# Patient Record
Sex: FEMALE | Race: WHITE | Employment: OTHER | URBAN - METROPOLITAN AREA
[De-identification: names, ages, dates, MRNs, and addresses within clinical notes are randomized per-mention and may not be internally consistent; named-entity substitution may affect disease eponyms.]

---

## 2017-02-16 ENCOUNTER — HOSPITAL ENCOUNTER (OUTPATIENT)
Dept: ULTRASOUND IMAGING | Age: 62
Discharge: HOME | End: 2017-02-16
Attending: ORTHOPAEDIC SURGERY
Payer: COMMERCIAL

## 2017-02-16 DIAGNOSIS — R60.9 SWELLING: ICD-10-CM

## 2017-02-16 DIAGNOSIS — M25.562 LEFT KNEE PAIN: ICD-10-CM

## 2017-02-16 PROCEDURE — 93971 EXTREMITY STUDY: CPT

## 2021-10-15 ENCOUNTER — TRANSCRIBE ORDER (OUTPATIENT)
Dept: SCHEDULING | Age: 66
End: 2021-10-15

## 2021-10-15 DIAGNOSIS — I67.1 CEREBRAL ANEURYSM: Primary | ICD-10-CM

## 2021-11-04 ENCOUNTER — HOSPITAL ENCOUNTER (OUTPATIENT)
Dept: CT IMAGING | Age: 66
Discharge: HOME | End: 2021-11-04
Attending: INTERNAL MEDICINE
Payer: COMMERCIAL

## 2021-11-04 DIAGNOSIS — I67.1 CEREBRAL ANEURYSM: ICD-10-CM

## 2021-11-04 LAB — CREAT BLD-MCNC: 0.8 MG/DL (ref 0.8–1.5)

## 2021-11-04 PROCEDURE — 70498 CT ANGIOGRAPHY NECK: CPT

## 2021-11-04 PROCEDURE — 74011000636 HC RX REV CODE- 636: Performed by: INTERNAL MEDICINE

## 2021-11-04 PROCEDURE — 74011000258 HC RX REV CODE- 258: Performed by: INTERNAL MEDICINE

## 2021-11-04 PROCEDURE — 82565 ASSAY OF CREATININE: CPT

## 2021-11-04 RX ORDER — SODIUM CHLORIDE 0.9 % (FLUSH) 0.9 %
10 SYRINGE (ML) INJECTION
Status: COMPLETED | OUTPATIENT
Start: 2021-11-04 | End: 2021-11-04

## 2021-11-04 RX ADMIN — Medication 10 ML: at 09:29

## 2021-11-04 RX ADMIN — IOPAMIDOL 50 ML: 755 INJECTION, SOLUTION INTRAVENOUS at 09:29

## 2021-11-04 RX ADMIN — SODIUM CHLORIDE 100 ML: 900 INJECTION, SOLUTION INTRAVENOUS at 09:29

## 2023-09-12 ENCOUNTER — OFFICE VISIT (OUTPATIENT)
Dept: ORTHOPEDIC SURGERY | Age: 68
End: 2023-09-12

## 2023-09-12 VITALS — HEIGHT: 63 IN | WEIGHT: 151 LBS | BODY MASS INDEX: 26.75 KG/M2

## 2023-09-12 DIAGNOSIS — M25.561 CHRONIC PAIN OF RIGHT KNEE: Primary | ICD-10-CM

## 2023-09-12 DIAGNOSIS — M17.12 PRIMARY OSTEOARTHRITIS OF LEFT KNEE: ICD-10-CM

## 2023-09-12 DIAGNOSIS — G89.29 CHRONIC PAIN OF RIGHT KNEE: Primary | ICD-10-CM

## 2023-09-12 DIAGNOSIS — M17.11 PRIMARY OSTEOARTHRITIS OF RIGHT KNEE: ICD-10-CM

## 2023-09-12 RX ORDER — METHYLPREDNISOLONE ACETATE 40 MG/ML
40 INJECTION, SUSPENSION INTRA-ARTICULAR; INTRALESIONAL; INTRAMUSCULAR; SOFT TISSUE ONCE
Status: COMPLETED | OUTPATIENT
Start: 2023-09-12 | End: 2023-09-12

## 2023-09-12 RX ADMIN — METHYLPREDNISOLONE ACETATE 80 MG: 40 INJECTION, SUSPENSION INTRA-ARTICULAR; INTRALESIONAL; INTRAMUSCULAR; SOFT TISSUE at 11:48

## 2023-09-12 NOTE — PROGRESS NOTES
Name: Sebastien Obrien  YOB: 1955  Gender: female  MRN: [id number]     CC: Right knee pain    HPI:   Years of bilateral knee concerns. Reports trying multiple injections including viscoelastic. May 2023: Bilateral knee pain increased after the passing of both her mother and father required her having to go up and down stairs to clean out their house. 09/03/2023: She reports getting out of her car with increased Right knee pain: No trauma  09/12/2023: Initial visit: Right knee pain    ROS/Meds/PSH/PMH/FH/SH: reviewed today    Tobacco:  reports that she has never smoked. She has never used smokeless tobacco.     Physical Examination:  Patient appears to be alert and oriented with acceptable appearance.   No obvious distress or SOB  CV: appears to have acceptable vascular color and capillary refill  Neuro: appears to have mostly intact light touch sensation   Skin: Bilateral anterior medial knee thickening; right soft knee effusion  MS: Standing: Genu varum: Gait slightly protected right  Right = anterior medial knee pain; no lateral pain; no patella pain  Left = mild anterior medial knee pain; no lateral pain; no patella pain  Bilateral = acceptable knee motion; 5/5 strength; no gross instability or crepitance    XR: Right knee: Standing AP lateral notch and sunrise views taken today with tricompartmental knee arthritis with medial column collapse; no acute pathology appreciated; multiple exostoses  XR Impression:  As above and compared to prior knee XR    Reviewed Test/Records/Documents:   06/08/2016: Dr. Martin Thomas: Left knee abrasion arthroplasty, partial medial meniscectomy  02/11/2021: Dr. Martin Thomas: Bilateral knee osteoarthritis injections Depo-Medrol     Injection: We discussed risks/complication of injection decided proceed: After sterile prep: Right anterior medial knee joint injected 2 cc Xylocaine, 80 mg Depo-Medrol; she did well    Assessment:    Right genu varum knee

## 2023-09-12 NOTE — PROGRESS NOTES
Patient was fitted and instructed on bilateral Genutrain Titan 4 Knee Braces for the both knees. Patient read and signed documenting they understand and agree to [facility]'s current DME return policy.

## 2023-10-05 ENCOUNTER — OFFICE VISIT (OUTPATIENT)
Dept: ORTHOPEDIC SURGERY | Age: 68
End: 2023-10-05

## 2023-10-05 DIAGNOSIS — M17.11 PRIMARY OSTEOARTHRITIS OF RIGHT KNEE: Primary | ICD-10-CM

## 2023-10-05 NOTE — PROGRESS NOTES
neurologic deficit. Grossly moves bilateral upper extremities without motor or sensory deficits. No gross weakness noted in the lower extremities. No hyporeflexia or hyperreflexia noted. Vascular: No gross arterial or venous deficiency noted. DP and PT pulses are palpable in the lower extremities  Lymphatic: No lymphedema noted in the lower extremities. Skin: No prior incisions noted about the right knee. No obvious rashes noted about the area. No skin changes noted about the knee or about the adjacent thigh or leg. Extremities:  Patient ambulates with an antalgic gait. There is pain with ROM of the right knee. Range of motion is 0-130. Trace effusion noted in the knee. Patellofemoral crepitus is present. Distally the patient shows no neurologic deficit. Xrays (obtained either today or previously):    Heading: XR Knee 3/4 View  Views: AP knee, skiers PA view, lateral knee, sunrise view right knee  Clinical indication: Right Knee Pain   Findings: Xrays of the knees obtained today or previously show tricompartmental bone-on-bone arthritic changes of the right knee with associated osteophyte formation and subchondral sclerosis. Impression: Right Knee osteoarthritis    Conor Mike MD    Assessment:   1. Arthritis of the Right knee    Plan:  Differential diagnosis and treatment options have been discussed with the patient. Risks, benefits and alternatives of each were discussed and patient questions answered. We discussed oral anti-inflammatory medications, activity modifications, physical therapy, corticosteroid injections, weight loss (if appropriate), and surgery. We discussed that given the degenerative nature of the joint that in most cases surgery is the definitive treatment for this condition. We did discuss some of the details of surgery along with some of the risks, benefits and alternatives.  At this point the patient is a candidate for surgery however they would like to try to postpone surgery at

## 2023-10-19 ENCOUNTER — OFFICE VISIT (OUTPATIENT)
Dept: ORTHOPEDIC SURGERY | Age: 68
End: 2023-10-19

## 2023-10-19 DIAGNOSIS — M17.11 PRIMARY OSTEOARTHRITIS OF RIGHT KNEE: Primary | ICD-10-CM

## 2023-10-19 RX ORDER — HYALURONATE SODIUM 10 MG/ML
20 SYRINGE (ML) INTRAARTICULAR ONCE
Status: COMPLETED | OUTPATIENT
Start: 2023-10-19 | End: 2023-10-19

## 2023-10-19 RX ADMIN — Medication 20 MG: at 09:55

## 2023-10-19 NOTE — PROGRESS NOTES
and alternatives regarding injection were discussed with patient and patient wished to proceed. Under sterile technique the right knee was injected with Euflexxa (2ml) . The patient tolerated procedure well. Patient is instructed to ice the joint for next few days if they experience discomfort.  The patient will followup as directed        Signed By: Barbie Lombardi MD  October 19, 2023

## 2023-10-26 ENCOUNTER — OFFICE VISIT (OUTPATIENT)
Dept: ORTHOPEDIC SURGERY | Age: 68
End: 2023-10-26

## 2023-10-26 DIAGNOSIS — M17.11 PRIMARY OSTEOARTHRITIS OF RIGHT KNEE: Primary | ICD-10-CM

## 2023-10-26 RX ORDER — HYALURONATE SODIUM 10 MG/ML
20 SYRINGE (ML) INTRAARTICULAR ONCE
Status: COMPLETED | OUTPATIENT
Start: 2023-10-26 | End: 2023-10-26

## 2023-10-26 RX ADMIN — Medication 20 MG: at 10:09

## 2023-10-26 NOTE — PROGRESS NOTES
Diagnosis: Right Knee Arthritis    Patient present today for the 2nd viscosupplimentation injection in the right knee. Prior injection was tolerated well. Risks, benefits, and alteratives were discussed with patient prior to injection. Under sterile technique, the right knee was injected with Euflexxa (2ml). The patient tolerated the procedure well. Patient is advised to ice the knee over the next week or so. Continue taking prior oral medications. The patient can follow-up as directed for the final injection in the series.       Gianni Peter, APRN - CNP

## 2023-11-02 ENCOUNTER — OFFICE VISIT (OUTPATIENT)
Dept: ORTHOPEDIC SURGERY | Age: 68
End: 2023-11-02

## 2023-11-02 DIAGNOSIS — M17.11 PRIMARY OSTEOARTHRITIS OF RIGHT KNEE: Primary | ICD-10-CM

## 2023-11-02 RX ORDER — HYALURONATE SODIUM 10 MG/ML
20 SYRINGE (ML) INTRAARTICULAR ONCE
Status: COMPLETED | OUTPATIENT
Start: 2023-11-02 | End: 2023-11-02

## 2023-11-02 RX ADMIN — Medication 20 MG: at 10:01

## 2023-11-02 NOTE — PROGRESS NOTES
Diagnosis: Right Knee arthritis    Patient present today for the 3rd viscosupplimentation injection in the right knee. Prior injection was tolerated well. Risks, benefits, and alteratives were discussed with patient prior to injection. Under sterile technique, the right knee was injected with Euflexxa (2ml). The patient tolerated the procedure well. Patient is advised to ice the knee over the next week or so. Continue taking prior oral medications. The patient can follow-up as directed for the final injection in the series.

## 2024-01-11 ENCOUNTER — OFFICE VISIT (OUTPATIENT)
Dept: ORTHOPEDIC SURGERY | Age: 69
End: 2024-01-11

## 2024-01-11 DIAGNOSIS — M17.11 PRIMARY OSTEOARTHRITIS OF RIGHT KNEE: Primary | ICD-10-CM

## 2024-01-11 PROCEDURE — 3017F COLORECTAL CA SCREEN DOC REV: CPT | Performed by: ORTHOPAEDIC SURGERY

## 2024-01-11 PROCEDURE — 1036F TOBACCO NON-USER: CPT | Performed by: ORTHOPAEDIC SURGERY

## 2024-01-11 RX ORDER — METHYLPREDNISOLONE ACETATE 40 MG/ML
40 INJECTION, SUSPENSION INTRA-ARTICULAR; INTRALESIONAL; INTRAMUSCULAR; SOFT TISSUE ONCE
Status: COMPLETED | OUTPATIENT
Start: 2024-01-11 | End: 2024-01-11

## 2024-01-11 RX ADMIN — METHYLPREDNISOLONE ACETATE 40 MG: 40 INJECTION, SUSPENSION INTRA-ARTICULAR; INTRALESIONAL; INTRAMUSCULAR; SOFT TISSUE at 14:02

## 2024-01-11 NOTE — PROGRESS NOTES
Patient ID:  Sarah Beth Chowdhury  [id number]  68 y.o.  1955    Today: 2024          Chief Complaint:  Right Knee pain    HPI:       Sarah Beth Chowdhury is a 68 y.o. female seen for evaluation and treatment of followup of right knee osteoarthritis. The patient reports pain along the joint lines, reports stiffness of the knee with prolonged inactivity, and swelling/pain at the end of the day and after increased physical activity. Generally, symptoms improve with sitting/rest. The pain affects the patient’s activities of daily living and quality of life. Patient reports progressive pain and instability in the knee. The pain has been ongoing for an extended period of time. Pain ranges from approximately 4-8 in a cyclical fashion with periods of acute exacerbation.     Patient has attempted prior conservative treatment including medications and activity modification.    Treatment to date has included OTC medications and activity modification. At one point the patient has had a steroid injection in the right knee which provided 3+ months of pain improvement.    Past Medical History:  Past Medical History:   Diagnosis Date    Arthritis     generalized    Chronic pain     Depression     daily meds    GERD (gastroesophageal reflux disease)     daily meds    Thyroid disease        Past Surgical History:  Past Surgical History:   Procedure Laterality Date    BREAST SURGERY      as a baby mammary sx    GYN       x1    TONSILLECTOMY          Medications:     Prior to Admission medications    Medication Sig Start Date End Date Taking? Authorizing Provider   amLODIPine (NORVASC) 5 MG tablet Take 5 mg by mouth daily 20   Automatic Reconciliation, Ar   ascorbic acid (VITAMIN C) 500 MG tablet Take by mouth daily    Automatic Reconciliation, Ar   aspirin 81 MG EC tablet Take 81 mg by mouth daily    Automatic Reconciliation, Ar   buPROPion (WELLBUTRIN XL) 300 MG extended release tablet Take 300 mg by

## 2024-04-11 ENCOUNTER — OFFICE VISIT (OUTPATIENT)
Dept: ORTHOPEDIC SURGERY | Age: 69
End: 2024-04-11

## 2024-04-11 DIAGNOSIS — M17.11 PRIMARY OSTEOARTHRITIS OF RIGHT KNEE: Primary | ICD-10-CM

## 2024-04-11 RX ORDER — METHYLPREDNISOLONE ACETATE 40 MG/ML
40 INJECTION, SUSPENSION INTRA-ARTICULAR; INTRALESIONAL; INTRAMUSCULAR; SOFT TISSUE ONCE
Status: COMPLETED | OUTPATIENT
Start: 2024-04-11 | End: 2024-04-11

## 2024-04-11 RX ADMIN — METHYLPREDNISOLONE ACETATE 40 MG: 40 INJECTION, SUSPENSION INTRA-ARTICULAR; INTRALESIONAL; INTRAMUSCULAR; SOFT TISSUE at 13:42

## 2024-04-11 NOTE — PROGRESS NOTES
noted.  Vascular: No gross arterial or venous deficiency noted. DP and PT pulses are palpable in the lower extremities  Lymphatic: No lymphedema noted in the lower extremities.  Skin: No prior incisions noted about the right knee. No obvious rashes noted about the area. No skin changes noted about the knee or about the adjacent thigh or leg.  Extremities:  Patient ambulates with an antalgic gait. There is pain with ROM of the left knee. Range of motion is 0-120. Trace effusion noted in the knee. Patellofemoral crepitus is present. Distally the patient shows no neurologic deficit.        Xrays (obtained either today or previously):    Heading: XR Knee 3/4 View  Views: AP knee, skiers PA knee, lateral knee, sunrise view right knee  Clinical indication: Right Knee Pain   Findings: Xrays of the knees obtained today or previously show tricompartmental bone-on-bone arthritic changes with associated osteophyte formation and subchondral sclerosis.  Impression: Right Knee osteoarthritis    Tuan Reardon MD    Assessment:   1. Arthritis of the Right knee    Plan:  Differential diagnosis and treatment options have been discussed with the patient. Risks, benefits and alternatives of each were discussed and patient questions answered. We discussed oral anti-inflammatory medications, activity modifications, physical therapy, corticosteroid injections, weight loss (if appropriate), and surgery. We discussed that given the degenerative nature of the joint that in most cases surgery is the definitive treatment for this condition. We did discuss some of the details of surgery along with some of the risks, benefits and alternatives. At this point the patient is a candidate for surgery however they would like to try to postpone surgery at this point in time if possible.  At this point the patient has failed the aforementioned treatment modalities and would like to proceed with Corticosteroid Injection. We discussed potential risks of

## 2024-07-11 ENCOUNTER — OFFICE VISIT (OUTPATIENT)
Dept: ORTHOPEDIC SURGERY | Age: 69
End: 2024-07-11

## 2024-07-11 DIAGNOSIS — M17.11 PRIMARY OSTEOARTHRITIS OF RIGHT KNEE: Primary | ICD-10-CM

## 2024-07-11 RX ORDER — METHYLPREDNISOLONE ACETATE 40 MG/ML
40 INJECTION, SUSPENSION INTRA-ARTICULAR; INTRALESIONAL; INTRAMUSCULAR; SOFT TISSUE ONCE
Status: COMPLETED | OUTPATIENT
Start: 2024-07-11 | End: 2024-07-11

## 2024-07-11 RX ADMIN — METHYLPREDNISOLONE ACETATE 40 MG: 40 INJECTION, SUSPENSION INTRA-ARTICULAR; INTRALESIONAL; INTRAMUSCULAR; SOFT TISSUE at 10:51

## 2024-07-11 NOTE — PROGRESS NOTES
Patient ID:  Sarah Beth Chowdhury  [id number]  68 y.o.  1955    Today: 2024          Chief Complaint:  Right Knee pain    HPI:       Sarah Beth Chowdhury is a 68 y.o. female seen for evaluation and treatment of followup of right knee osteoarthritis. The patient reports pain along the joint lines, reports stiffness of the knee with prolonged inactivity, and swelling/pain at the end of the day and after increased physical activity. Generally, symptoms improve with sitting/rest. The pain affects the patient’s activities of daily living and quality of life. Patient reports progressive pain and instability in the knee. The pain has been ongoing for an extended period of time. Pain ranges from approximately 4-8 in a cyclical fashion with periods of acute exacerbation.     Patient has attempted prior conservative treatment including medications and activity modification.    Treatment to date has included OTC medications and activity modification. At one point the patient has had a steroid injection in the right knee which provided 3+ months of pain improvement.    Past Medical History:  Past Medical History:   Diagnosis Date    Arthritis     generalized    Chronic pain     Depression     daily meds    GERD (gastroesophageal reflux disease)     daily meds    Thyroid disease        Past Surgical History:  Past Surgical History:   Procedure Laterality Date    BREAST SURGERY      as a baby mammary sx    GYN       x1    TONSILLECTOMY          Medications:     Prior to Admission medications    Medication Sig Start Date End Date Taking? Authorizing Provider   amLODIPine (NORVASC) 5 MG tablet Take 5 mg by mouth daily 20   Automatic Reconciliation, Ar   ascorbic acid (VITAMIN C) 500 MG tablet Take by mouth daily    Automatic Reconciliation, Ar   aspirin 81 MG EC tablet Take 81 mg by mouth daily    Automatic Reconciliation, Ar   buPROPion (WELLBUTRIN XL) 300 MG extended release tablet Take 300 mg by

## 2024-10-11 ENCOUNTER — OFFICE VISIT (OUTPATIENT)
Dept: ORTHOPEDIC SURGERY | Age: 69
End: 2024-10-11

## 2024-10-11 DIAGNOSIS — M17.0 PRIMARY OSTEOARTHRITIS OF BOTH KNEES: ICD-10-CM

## 2024-10-11 DIAGNOSIS — M25.562 PAIN IN BOTH KNEES, UNSPECIFIED CHRONICITY: Primary | ICD-10-CM

## 2024-10-11 DIAGNOSIS — M25.561 PAIN IN BOTH KNEES, UNSPECIFIED CHRONICITY: Primary | ICD-10-CM

## 2024-10-11 RX ORDER — METHYLPREDNISOLONE ACETATE 40 MG/ML
40 INJECTION, SUSPENSION INTRA-ARTICULAR; INTRALESIONAL; INTRAMUSCULAR; SOFT TISSUE ONCE
Status: COMPLETED | OUTPATIENT
Start: 2024-10-11 | End: 2024-10-11

## 2024-10-11 RX ADMIN — METHYLPREDNISOLONE ACETATE 40 MG: 40 INJECTION, SUSPENSION INTRA-ARTICULAR; INTRALESIONAL; INTRAMUSCULAR; SOFT TISSUE at 10:32

## 2024-10-11 NOTE — PATIENT INSTRUCTIONS
Learning About Joint Injections  What are joint injections?     Joint injections are shots into a joint, such as the knee or shoulder. They are used to put in medicines, such as pain relievers and steroid medicines. Steroids can help reduce inflammation. A steroid shot can sometimes help with short-term pain relief when other treatments haven't worked.  How are they done?  First, the area over the joint will be cleaned. Your doctor may then use a tiny needle to numb the skin in the area where you will get the joint injection.  If a tiny needle is used to numb the area, your doctor will use another needle to inject the medicine. Your doctor may use a pain reliever, a steroid, or both. You may feel some pressure or discomfort.  Your doctor may put ice on the area before you go home.  What can you expect after a joint injection?  You will probably go home soon after your shot. You may have numbness around the joint for a few hours.  If your shot included both a pain reliever and a steroid, then the pain will probably go away right away. But it might come back after a few hours. This might happen if the pain reliever wears off and the steroid hasn't started to work yet. Steroids don't always work. But when they do, the pain relief can last for several days to a few months or longer.  Your doctor may tell you to use ice on the area. You can also use ice if the pain comes back. Put ice or a cold pack on your joint for 10 to 20 minutes at a time. Put a thin cloth between the ice and your skin.  Follow your doctor's instructions carefully.  Follow-up care is a key part of your treatment and safety. Be sure to make and go to all appointments, and call your doctor if you are having problems. It's also a good idea to know your test results and keep a list of the medicines you take.  Current as of: July 17, 2023  Content Version: 14.2  © 2024 [email or web address].   Care instructions adapted under license by [facility]

## 2024-10-11 NOTE — PROGRESS NOTES
candidate for surgery secondary to the patients desire to exhaust conservative treatment options and delay surgery.  At this point the patient has failed the aforementioned treatment modalities and would like to proceed with Corticosteroid Injection. We discussed potential risks of steroid injection including but not limited to steroid flare with an associated increase in pain, fat necrosis, skin discoloration around the injection site, temporary increase in blood sugars in diabetic patients and possible facial flushing after injection.    Treatment:    Steroid Injection - Risks, benefits, and alternatives of corticosteroid injection were discussed. After any questions were address the patient wished to proceed with injection.   A timeout was performed which included identifying the patient by name and date of birth, verifying correct procedure and correct site(s). After prepping the injection site each knee was injected with 1cc of 40mg  Depomedrol/3cc marcaine. Patient tolerated the procedure well. Patient is instructed to ice the joint for next few days if they experience discomfort. The patient will followup as directed or as needed.        Signed By: MARIELA FRANCO - CNP  October 11, 2024

## 2025-01-16 ENCOUNTER — OFFICE VISIT (OUTPATIENT)
Dept: ORTHOPEDIC SURGERY | Age: 70
End: 2025-01-16

## 2025-01-16 DIAGNOSIS — M17.0 PRIMARY OSTEOARTHRITIS OF BOTH KNEES: Primary | ICD-10-CM

## 2025-01-16 RX ORDER — METHYLPREDNISOLONE ACETATE 40 MG/ML
40 INJECTION, SUSPENSION INTRA-ARTICULAR; INTRALESIONAL; INTRAMUSCULAR; SOFT TISSUE ONCE
Status: COMPLETED | OUTPATIENT
Start: 2025-01-16 | End: 2025-01-16

## 2025-01-16 RX ADMIN — METHYLPREDNISOLONE ACETATE 40 MG: 40 INJECTION, SUSPENSION INTRA-ARTICULAR; INTRALESIONAL; INTRAMUSCULAR; SOFT TISSUE at 14:48

## 2025-01-16 NOTE — PROGRESS NOTES
without obvious arterial or venous deficiency. Pulses palpable bilateral lower extremities.  Lymphatic: No obvious lymphedema or lymphadenopathy noted.  Skin: No obvious lacerations or rashes noted.  Musculoskeletal: No obvious deformity or pain with active movement of the upper extremities.  Neuro: No obvious deficiency or weakness noted of the upper or lower extremities    Knee Exam:  There is pain with ROM of both knees. Range of motion on the right is 0-120. Range of motion on the left is 0-120. Trace effusion noted in the knees. Patellofemoral crepitus is present. Tenderness along the joint line both at rest and during motion.  There is no significant ligamentous laxity. Distally the patient shows no neurologic deficit.   Sensation is grossly intact.  The patient is able to grossly plantar- and dorsi-flex the involved foot/ankle.             Imaging (obtained today or previously):    Heading: XR Knee 3-4 View  Views: AP knee, skiers PA knee, lateral knee, sunrise view bilateral knee  Clinical indication: Bilateral Knee Pain   Findings: Xrays of the knees obtained today or previously show tricompartmental bone-on-bone arthritic changes with associated osteophyte formation and subchondral sclerosis.  Impression: Bilateral Knee osteoarthritis    Tuan Reardon MD      Assessment:   1. Bilateral Knee Arthritis    Plan:  Treatment option have been discussed with the patient. The patient understands the nature of knee arthritis and that this is generally a progressive condition. We discussed oral anti-inflammatory medications, activity modifications, physical therapy, corticosteroid injections, weight loss (if appropriate), and surgery. We discussed that given the degenerative nature of the joint that in most cases surgery is the definitive treatment for this condition. We did discuss some of the details of surgery along with some of the risks, benefits and alternatives. At this point the patient is a candidate for

## 2025-04-24 ENCOUNTER — OFFICE VISIT (OUTPATIENT)
Dept: ORTHOPEDIC SURGERY | Age: 70
End: 2025-04-24

## 2025-04-24 DIAGNOSIS — M17.0 PRIMARY OSTEOARTHRITIS OF BOTH KNEES: Primary | ICD-10-CM

## 2025-04-24 RX ORDER — METHYLPREDNISOLONE ACETATE 40 MG/ML
40 INJECTION, SUSPENSION INTRA-ARTICULAR; INTRALESIONAL; INTRAMUSCULAR; SOFT TISSUE ONCE
Status: COMPLETED | OUTPATIENT
Start: 2025-04-24 | End: 2025-04-24

## 2025-04-24 RX ADMIN — METHYLPREDNISOLONE ACETATE 40 MG: 40 INJECTION, SUSPENSION INTRA-ARTICULAR; INTRALESIONAL; INTRAMUSCULAR; SOFT TISSUE at 11:42

## 2025-04-24 NOTE — PROGRESS NOTES
Patient ID:  Sarah Beth Chowdhury  [id number]  69 y.o.  1955    Today: 2025          Chief Complaint:  Bilateral Knee pain    HPI:       Sarah Beth Chowdhury is a 69 y.o. female  that presents today for followup of bilateral knee pain. The patient has previously been evaluated and treated for this problem. The patient complains of knee pain with activities, reports stiffness of the knee with prolonged inactivity, and swelling/pain at the end of the day and after increased physical activity.  Pain is described as a general ache with occasional sharp pain, primarily along the joint lines. The pain is rated as ranging from 3-8 with periods of acute exacerbation and periods with less severe pain. Generally, symptoms improve with sitting/rest. The pain affects the patient’s activities of daily living and quality of life. Patient reports progressive pain and instability in the knee.     Patient has attempted prior conservative treatment including over-the-counter medications and activity modification. Patient has previously had an intra-articular steroid injection which at one point has provided 3+ months of pain relief.    Past Medical History:  Past Medical History:   Diagnosis Date    Arthritis     generalized    Chronic pain     Depression     daily meds    GERD (gastroesophageal reflux disease)     daily meds    Thyroid disease        Past Surgical History:  Past Surgical History:   Procedure Laterality Date    BREAST SURGERY      as a baby mammary sx    GYN       x1    TONSILLECTOMY          Medications:     Prior to Admission medications    Medication Sig Start Date End Date Taking? Authorizing Provider   amLODIPine (NORVASC) 5 MG tablet Take 5 mg by mouth daily 20   Automatic Reconciliation, Ar   ascorbic acid (VITAMIN C) 500 MG tablet Take by mouth daily    Automatic Reconciliation, Ar   aspirin 81 MG EC tablet Take 81 mg by mouth daily    Automatic Reconciliation, Ar   buPROPion

## 2025-07-24 ENCOUNTER — OFFICE VISIT (OUTPATIENT)
Dept: ORTHOPEDIC SURGERY | Age: 70
End: 2025-07-24

## 2025-07-24 DIAGNOSIS — M17.0 PRIMARY OSTEOARTHRITIS OF BOTH KNEES: Primary | ICD-10-CM

## 2025-07-24 RX ORDER — METHYLPREDNISOLONE ACETATE 40 MG/ML
40 INJECTION, SUSPENSION INTRA-ARTICULAR; INTRALESIONAL; INTRAMUSCULAR; SOFT TISSUE ONCE
Status: COMPLETED | OUTPATIENT
Start: 2025-07-24 | End: 2025-07-24

## 2025-07-24 RX ADMIN — METHYLPREDNISOLONE ACETATE 40 MG: 40 INJECTION, SUSPENSION INTRA-ARTICULAR; INTRALESIONAL; INTRAMUSCULAR; SOFT TISSUE at 09:24

## 2025-07-24 NOTE — PROGRESS NOTES
(WELLBUTRIN XL) 300 MG extended release tablet Take 300 mg by mouth daily 10/26/20   Automatic Reconciliation, Ar   ezetimibe (ZETIA) 10 MG tablet Take 10 mg by mouth daily 9/28/20   Automatic Reconciliation, Ar   FLUoxetine (PROZAC) 20 MG capsule Take 20 mg by mouth every other day    Automatic Reconciliation, Ar   fluticasone (FLONASE) 50 MCG/ACT nasal spray 2 sprays by Nasal route as needed    Automatic Reconciliation, Ar   levothyroxine (SYNTHROID) 100 MCG tablet Take 100 mcg by mouth every morning (before breakfast)    Automatic Reconciliation, Ar   naproxen-esomeprazole 500-20 MG TBEC Take by mouth 2 times daily    Automatic Reconciliation, Ar   omeprazole (PRILOSEC) 40 MG delayed release capsule Take 40 mg by mouth daily    Automatic Reconciliation, Ar   pitavastatin (LIVALO) 2 MG TABS tablet Take 2 mg by mouth daily 11/14/20   Automatic Reconciliation, Ar   vitamin E 1000 units capsule Take 400 Units by mouth daily    Automatic Reconciliation, Ar       Family History:     Family History   Problem Relation Age of Onset    Heart Disease Father     Deep Vein Thrombosis Mother     Stroke Mother     Stroke Father     Cancer Mother         bladder       Social History:      Social History     Tobacco Use    Smoking status: Never    Smokeless tobacco: Never   Substance Use Topics    Alcohol use: No           Allergies:    No Known Allergies     Vitals:   There were no vitals taken for this visit.    ROS:  Patient Health Form has been filled out, reviewed, signed and included in the chart.  ROS findings related to musculoskeletal problems were discussed with the patient.  Patient advised to discuss non-orthopedic complaints with primary care physician.      Objective:     Vitals:   There were no vitals taken for this visit.    General: Awake and alert. AAOx3.   Psych: Mood appropriate  HEENT: Normocephalic, atraumatic  Neck: Supple  CV/Pulm: Breathing even and unlabored  Abdomen: Nondistended  Circulation: Normal